# Patient Record
Sex: FEMALE | Race: WHITE | NOT HISPANIC OR LATINO | URBAN - NONMETROPOLITAN AREA
[De-identification: names, ages, dates, MRNs, and addresses within clinical notes are randomized per-mention and may not be internally consistent; named-entity substitution may affect disease eponyms.]

---

## 2019-02-11 ENCOUNTER — IMPORTED ENCOUNTER (OUTPATIENT)
Dept: URBAN - NONMETROPOLITAN AREA CLINIC 1 | Facility: CLINIC | Age: 39
End: 2019-02-11

## 2019-02-11 PROBLEM — H52.12: Noted: 2019-02-11

## 2019-02-11 PROBLEM — H52.223: Noted: 2019-02-11

## 2019-02-11 PROCEDURE — S0621 ROUTINE OPHTHALMOLOGICAL EXA: HCPCS

## 2019-02-11 NOTE — PATIENT DISCUSSION
Simple Astigmatism OD/Compound Myopic Astigmatism OS-  discussed findings w/patient-  new spectacle Rx issued-  monitor yearly or prn; 's Notes: MR 2/11/2019DFE 2/11/2019

## 2021-05-27 ENCOUNTER — IMPORTED ENCOUNTER (OUTPATIENT)
Dept: URBAN - NONMETROPOLITAN AREA CLINIC 1 | Facility: CLINIC | Age: 41
End: 2021-05-27

## 2021-05-27 PROBLEM — H52.223: Noted: 2021-05-27

## 2021-05-27 PROCEDURE — S0621 ROUTINE OPHTHALMOLOGICAL EXA: HCPCS

## 2021-05-27 NOTE — PATIENT DISCUSSION
Simple Astigmatism OU -  discussed findings w/patient-  mild change only-  patient will keep current regular Rx and get Rx sunglasses-  new spectacle Rx issued-  monitor yearly or prn; 's Notes: MR 5/27/2021DFE 5/27/2021

## 2022-04-15 ASSESSMENT — VISUAL ACUITY
OD_CC: 20/30-
OU_SC: 20/30
OS_CC: 20/30
OS_SC: 20/20-
OS_SC: 20/20
OS_CC: 20/25-2
OD_CC: 20/30-2

## 2022-04-15 ASSESSMENT — TONOMETRY
OS_IOP_MMHG: 17
OD_IOP_MMHG: 17
OD_IOP_MMHG: 15
OS_IOP_MMHG: 15

## 2024-04-01 ENCOUNTER — COMPREHENSIVE EXAM (OUTPATIENT)
Dept: URBAN - NONMETROPOLITAN AREA CLINIC 4 | Facility: CLINIC | Age: 44
End: 2024-04-01

## 2024-04-01 DIAGNOSIS — H04.123: ICD-10-CM

## 2024-04-01 DIAGNOSIS — H52.223: ICD-10-CM

## 2024-04-01 PROCEDURE — 92015 DETERMINE REFRACTIVE STATE: CPT

## 2024-04-01 PROCEDURE — 92014 COMPRE OPH EXAM EST PT 1/>: CPT

## 2024-04-01 ASSESSMENT — TONOMETRY
OS_IOP_MMHG: 16
OD_IOP_MMHG: 16

## 2024-04-01 ASSESSMENT — VISUAL ACUITY
OD_SC: 20/25-1
OS_SC: 20/25-1

## 2025-07-03 ENCOUNTER — COMPREHENSIVE EXAM (OUTPATIENT)
Age: 45
End: 2025-07-03

## 2025-07-03 DIAGNOSIS — H04.123: ICD-10-CM

## 2025-07-03 DIAGNOSIS — H52.223: ICD-10-CM

## 2025-07-03 PROCEDURE — 92014 COMPRE OPH EXAM EST PT 1/>: CPT

## 2025-07-03 PROCEDURE — 92015 DETERMINE REFRACTIVE STATE: CPT
